# Patient Record
Sex: FEMALE | Race: BLACK OR AFRICAN AMERICAN
[De-identification: names, ages, dates, MRNs, and addresses within clinical notes are randomized per-mention and may not be internally consistent; named-entity substitution may affect disease eponyms.]

---

## 2017-03-20 ENCOUNTER — HOSPITAL ENCOUNTER (EMERGENCY)
Dept: HOSPITAL 62 - ER | Age: 32
Discharge: LEFT BEFORE DISCHARGE | End: 2017-03-20
Payer: SELF-PAY

## 2017-03-20 VITALS — SYSTOLIC BLOOD PRESSURE: 147 MMHG | DIASTOLIC BLOOD PRESSURE: 93 MMHG

## 2017-03-20 DIAGNOSIS — R05: Primary | ICD-10-CM

## 2017-03-20 PROCEDURE — 99281 EMR DPT VST MAYX REQ PHY/QHP: CPT

## 2017-03-20 NOTE — ER DOCUMENT REPORT
ED Medical Screen (RME)





- General


Chief Complaint: Cough


Stated Complaint: COUGH,CONGESTION


Time seen by provider: 19:21


Mode of Arrival: Ambulatory


Information source: Patient


Notes: 


31-year-old female presents to ED for congestion cough denies any fever for the 

last week.  02/03/2017 last menstrual period.

















I have greeted and performed a rapid initial assessment of this patient.  A 

comprehensive ED assessment and evaluation of the patient, analysis of test 

results and completion of medical decision making process will be conducted by 

an additional ED providers.


TRAVEL OUTSIDE OF THE [place] IN LAST 30 DAYS: No





- Related Data


Allergies/Adverse Reactions: 


 





No Known Allergies Allergy (Verified 07/25/14 07:01)


 











Past Medical History


Neurological Medical History: Reports: Hx Migraine


Past Surgical History: Reports: Hx Oral Surgery - wisdom teeth





- Immunizations


Hx Diphtheria, Pertussis, Tetanus Vaccination: Yes





Physical Exam





- Vital signs


Vitals: 





 











Temp Pulse Resp BP Pulse Ox


 


 99.2 F   104 H  20   147/93 H  99 


 


 03/20/17 19:10  03/20/17 19:10  03/20/17 19:10  03/20/17 19:10  03/20/17 19:10














Course





- Vital Signs


Vital signs: 





 











Temp Pulse Resp BP Pulse Ox


 


 99.2 F   104 H  20   147/93 H  99 


 


 03/20/17 19:10  03/20/17 19:10  03/20/17 19:10  03/20/17 19:10  03/20/17 19:10

## 2018-08-14 ENCOUNTER — HOSPITAL ENCOUNTER (EMERGENCY)
Dept: HOSPITAL 62 - ER | Age: 33
Discharge: HOME | End: 2018-08-14
Payer: SELF-PAY

## 2018-08-14 VITALS — DIASTOLIC BLOOD PRESSURE: 96 MMHG | SYSTOLIC BLOOD PRESSURE: 144 MMHG

## 2018-08-14 DIAGNOSIS — R09.81: ICD-10-CM

## 2018-08-14 DIAGNOSIS — J34.89: ICD-10-CM

## 2018-08-14 DIAGNOSIS — H92.09: ICD-10-CM

## 2018-08-14 DIAGNOSIS — J06.9: ICD-10-CM

## 2018-08-14 DIAGNOSIS — J02.9: Primary | ICD-10-CM

## 2018-08-14 DIAGNOSIS — F17.200: ICD-10-CM

## 2018-08-14 PROCEDURE — 87070 CULTURE OTHR SPECIMN AEROBIC: CPT

## 2018-08-14 PROCEDURE — 87880 STREP A ASSAY W/OPTIC: CPT

## 2018-08-14 PROCEDURE — 99283 EMERGENCY DEPT VISIT LOW MDM: CPT

## 2018-08-14 NOTE — ER DOCUMENT REPORT
HPI





- HPI


Patient complains to provider of: Cold


Onset: Other - 4 days


Onset/Duration: Persistent


Quality of pain: Achy, Pressure


Pain Level: 5


Context: 





Patient presents complaining of head cold symptoms for the past 4 days.  

Patient reports sore throat, sinus pressure and congestion.  Patient denies any 

cough or fever.


Associated Symptoms: Earache, Rhinnorhea, Sinus pain/drainage, Sore throat.  

denies: Nonproductive cough, Fever, Nausea


Exacerbated by: Denies


Relieved by: Denies


Similar symptoms previously: Yes


Recently seen / treated by doctor: No





- ROS


ROS below otherwise negative: Yes


Systems Reviewed and Negative: Yes All other systems reviewed and negative





- CONSTITUTIONAL


Constitutional: DENIES: Fever, Chills





- EENT


EENT: REPORTS: Sore Throat, Nasal Drainage-Clear, Congestion





- RESPIRATORY


Respiratory: DENIES: Trouble Breathing, Coughing





- GASTROINTESTINAL


Gastrointestinal: DENIES: Nausea, Patient vomiting





- REPRODUCTIVE


Reproductive: DENIES: Pregnant:





- DERM


Skin Color: Normal


Skin Problems: None





Past Medical History





- General


Information source: Patient





- Social History


Smoking Status: Current Every Day Smoker


Chew tobacco use (# tins/day): No


Smoking Education Provided: Yes


Frequency of alcohol use: Occasional


Drug Abuse: None


Occupation:  provider


Lives with: Family


Family History: None


Patient has suicidal ideation: No


Patient has homicidal ideation: No





- Medical History


Medical History: Negative


Neurological Medical History: Reports: Hx Migraine


Renal/ Medical History: Denies: Hx Peritoneal Dialysis


Past Surgical History: Reports: Hx Oral Surgery - wisdom teeth





- Immunizations


Hx Diphtheria, Pertussis, Tetanus Vaccination: Yes





Vertical Provider Document





- CONSTITUTIONAL


Agree With Documented VS: Yes


Exam Limitations: No Limitations


General Appearance: WD/WN, No Apparent Distress





- INFECTION CONTROL


TRAVEL OUTSIDE OF THE [place] IN LAST 30 DAYS: No





- HEENT


HEENT: Pharyngeal Tenderness, Pharyngeal Erythema.  negative: Pharyngeal Exudate


Notes: 





clear rhinorrhea, +nasal congestion





- NECK


Neck: Normal Inspection, Supple.  negative: Lymphadenopathy-Left, 

Lymphadenopathy-Right





- RESPIRATORY


Respiratory: Breath Sounds Normal, No Respiratory Distress





- CARDIOVASCULAR


Cardiovascular: Regular Rate, Regular Rhythm





- BACK


Back: Normal Inspection





- MUSCULOSKELETAL/EXTREMETIES


Musculoskeletal/Extremeties: MAEW, FROM





- NEURO


Level of Consciousness: Awake, Alert, Appropriate


Motor/Sensory: No Motor Deficit





- DERM


Integumentary: Warm, Dry, No Rash





Course





- Vital Signs


Vital signs: 


 











Temp Pulse Resp BP Pulse Ox


 


 98.9 F   95   16   144/96 H  100 


 


 08/14/18 12:05  08/14/18 12:05  08/14/18 12:05  08/14/18 12:05  08/14/18 12:05














- Laboratory


Laboratory results interpreted by me: 





08/14/18 13:56


 Labs- Entire Visit











  08/14/18





  12:52


 


Group A Strep Rapid  NEGATIVE














Discharge





- Discharge


Clinical Impression: 


 Sore throat





Upper respiratory infection


Qualifiers:


 URI type: unspecified URI Qualified Code(s): J06.9 - Acute upper respiratory 

infection, unspecified





Condition: Stable


Disposition: HOME, SELF-CARE


Instructions:  Sore Throat ([facility]), Upper Respiratory Illness ([facility])


Additional Instructions: 


Return immediately for any new or worsening symptoms





Followup with your primary care provider, call tomorrow to make a followup 

appointment





Throat culture is pending, we will call if you need any different treatment


Prescriptions: 


Fluticasone Propionate [Flonase Nasal Spray 50 Mcg/Spray 16 gm] 2 spray NASL 

DAILY #1 bottle


Guaifenesin/Pseudoephedrne HCl [Mucinex D ER Tablet] 1 each PO Q12 PRN #12 

tab.er.12h


 PRN Reason: 


Naproxen [Naprosyn 250 Nmg Tablet] 1 tab PO BID #14 tablet


Forms:  Smoking Cessation Education, Return to Work


Referrals: 


[facility] [Provider Group] - Follow up as needed

## 2019-10-09 ENCOUNTER — HOSPITAL ENCOUNTER (EMERGENCY)
Dept: HOSPITAL 62 - ER | Age: 34
Discharge: HOME | End: 2019-10-09
Payer: SELF-PAY

## 2019-10-09 VITALS — SYSTOLIC BLOOD PRESSURE: 158 MMHG | DIASTOLIC BLOOD PRESSURE: 112 MMHG

## 2019-10-09 DIAGNOSIS — R03.0: ICD-10-CM

## 2019-10-09 DIAGNOSIS — R11.0: ICD-10-CM

## 2019-10-09 DIAGNOSIS — J02.9: ICD-10-CM

## 2019-10-09 DIAGNOSIS — R05: ICD-10-CM

## 2019-10-09 DIAGNOSIS — R09.81: ICD-10-CM

## 2019-10-09 DIAGNOSIS — J06.9: Primary | ICD-10-CM

## 2019-10-09 PROCEDURE — 99283 EMERGENCY DEPT VISIT LOW MDM: CPT

## 2019-10-09 PROCEDURE — 71046 X-RAY EXAM CHEST 2 VIEWS: CPT

## 2019-10-09 NOTE — ER DOCUMENT REPORT
ED ENT





- General


Chief Complaint: Sore Throat


Stated Complaint: COUGH,CONGESTION,NAUSEA


Time Seen by Provider: 10/09/19 10:05


Primary Care Provider: 


[facility] [Provider Group] - Follow up as needed


TRAVEL OUTSIDE OF THE [place] IN LAST 30 DAYS: No





- HPI


Notes: 





34-year-old female to the emergency department with complaints of 1 week of 

cough, chest congestion, headache, generalized body aches, sore throat.  She 

states that she is been taking over-the-counter medicines such as TheraFlu, 

Tylenol severe cold, and Robitussin.  She states that she has not gotten better 

despite these over-the-counter remedies.  She denies any recent fevers.  She 

denies any recent sick contacts that she knows of.  She does smoke.





- Related Data


Allergies/Adverse Reactions: 


                                        





No Known Allergies Allergy (Verified 10/09/19 09:54)


   











Past Medical History





- General


Information source: Patient





- Social History


Smoking Status: Current Every Day Smoker


Chew tobacco use (# tins/day): No


Frequency of alcohol use: Rare


Drug Abuse: None


Family History: Reviewed & Not Pertinent


Patient has suicidal ideation: No


Patient has homicidal ideation: No


Neurological Medical History: Reports: Hx Migraine


Renal/ Medical History: Denies: Hx Peritoneal Dialysis


Past Surgical History: Reports: Hx Oral Surgery - wisdom teeth





- Immunizations


Hx Diphtheria, Pertussis, Tetanus Vaccination: Yes





Review of Systems





- Review of Systems


Constitutional: Chills, Malaise.  denies: Fever


EENT: Nose congestion, Throat pain.  denies: Ear pain, Throat swelling, Mouth 

swelling


Cardiovascular: denies: Chest pain, Palpitations, Dyspnea, Syncope, Dizziness, 

Lightheaded


Respiratory: Cough.  denies: Hurts to breathe, Short of breath, Sputum


Gastrointestinal: denies: Abdominal pain, Diarrhea, Nausea, Vomiting


Genitourinary: No symptoms reported


Female Genitourinary: No symptoms reported


Musculoskeletal: No symptoms reported


Skin: No symptoms reported


Hematologic/Lymphatic: No symptoms reported


Neurological/Psychological: No symptoms reported


-: Yes All other systems reviewed and negative





Physical Exam





- Vital signs


Vitals: 


                                        











Temp Pulse Resp BP


 


 98.4 F   91   18   158/112 H


 


 10/09/19 09:47  10/09/19 09:47  10/09/19 09:47  10/09/19 09:47











Interpretation: Hypertensive





- General


General appearance: Appears well, Alert


In distress: None





- HEENT


Head: Normocephalic, Atraumatic


Eyes: Normal


Cornea: Normal


Pupils: PERRL


Ears: Normal


External canal: Normal


Tympanic membrane: Normal


Sinus: Normal.  No: Tenderness


Nasal: Clear rhinorrhea.  No: Bloody discharge


Mouth/Lips: Normal.  No: Angioedema


Mucous membranes: Normal


Pharynx: Erythema, Post nasal drainage, Tonsillar hypertrophy - Bilateral 

symmetric tonsillar hypertrophy, approximately 1+ bilaterally.  No evidence for 

peritonsillar abscess.  There is no ludwigs angina.  Airway is grossly patent.  

No: Exudate, Peritonsillar abscess, Retropharyngeal abscess, Uvular edema, 

Potential airway comprom.


Neck: Normal, Lymphadenopathy - Mild anterior lymphadenopathy., Supple.  No: 

Meningismus





- Respiratory


Respiratory status: No respiratory distress


Chest status: Nontender.  No: Pain on movement, Pain with cough, Accessory 

muscle use


Breath sounds: Nonproductive cough.  No: Rales, Rhonchi, Stridor, Wheezing


Chest palpation: Normal





- Cardiovascular


Rhythm: Regular


Heart sounds: Normal auscultation


Murmur: No





- Abdominal


Inspection: Normal


Distension: No distension


Bowel sounds: Normal


Tenderness: Nontender


Organomegaly: No organomegaly





- Back


Back: Normal, Nontender.  No: CVA tenderness





- Extremities


General upper extremity: Normal inspection, Nontender, Normal color, Normal ROM,

Normal temperature


General lower extremity: Normal inspection, Nontender, Normal color, Normal ROM,

Normal temperature





- Neurological


Neuro grossly intact: Yes


Cognition: Normal


Orientation: AAOx4


[place] Coma Scale Eye Opening: Spontaneous


[place] Coma Scale Verbal: Oriented


[place] Coma Scale Motor: Obeys Commands


[place] Coma Scale Total: 15


Speech: Normal


Motor strength normal: LUE, RUE, LLE, RLE


Sensory: Normal





- Skin


Skin Temperature: Warm


Skin Moisture: Dry


Skin Color: Normal





Course





- Re-evaluation


Re-evalutation: 








                                        





Chest X-Ray  10/09/19 10:13


IMPRESSION:  No focal consolidation or other evidence of acute cardiopulmonary 

process.


 








IMpressionL  URI, [place] end patient home with medicines for symptomatic relief. 

Likely viral in nature, do no think that ABx are necessary at this time.  

Encouraged to return if worsening symptoms.  Patient agrees with the plan. 








- Vital Signs


Vital signs: 


                                        











Temp Pulse Resp BP Pulse Ox


 


 98.4 F   91   18   158/112 H   


 


 10/09/19 09:47  10/09/19 09:47  10/09/19 09:47  10/09/19 09:47   














Discharge





- Discharge


Clinical Impression: 


 Sore throat, Flu-like symptoms, Elevated blood pressure reading





URI (upper respiratory infection)


Qualifiers:


 URI type: unspecified viral URI Qualified Code(s): J06.9 - Acute upper 

respiratory infection, unspecified





Condition: Stable


Disposition: HOME, SELF-CARE


Instructions:  Sore Throat ([facility]), Upper Respiratory Illness ([facility])


Additional Instructions: 


PUSH FLUIDS.  REST AT HOME.  STOP OVER THE COUNTER MEDICINES AND TAKE 

PRESCRIPTIONS ONLY.  FOLLOW UP WITH PRIMARY CARE.  


Prescriptions: 


Ibuprofen [Motrin 600 mg Tablet] 600 mg PO Q8HP PRN #24 tablet


 PRN Reason: 


Codeine Phosphate/Guaifenesin [Cheratussin AC Syrup] 10 ml PO Q6H #120 ml


Cetirizine HCl [Zyrtec 10 mg Tablet] 1 tab PO DAILY #30 tablet


Forms:  Elevated Blood Pressure, Return to Work


Referrals: 


[facility] [Provider Group] - Follow up as needed

## 2019-10-09 NOTE — RADIOLOGY REPORT (SQ)
EXAM DESCRIPTION:  CHEST 2 VIEWS



COMPLETED DATE/TIME:  10/9/2019 10:30 am



REASON FOR STUDY:  cough for one week



COMPARISON:  None.



EXAM PARAMETERS:  NUMBER OF VIEWS: two views

TECHNIQUE: Digital Frontal and Lateral radiographic views of the chest acquired.

RADIATION DOSE: NA

LIMITATIONS: none



FINDINGS:  LUNGS AND PLEURA: No opacities, masses or pneumothorax. No pleural effusion.

MEDIASTINUM AND HILAR STRUCTURES: No masses or contour abnormalities.

HEART AND VASCULAR STRUCTURES: Heart normal size.  No evidence for failure.

BONES: No acute findings.

HARDWARE: None in the chest.

OTHER: No other significant finding.



IMPRESSION:  No focal consolidation or other evidence of acute cardiopulmonary process.



TECHNICAL DOCUMENTATION:  JOB ID:  [id number]

 2011 [employer]- All Rights Reserved



Reading location - IP/workstation name: [email or web address]